# Patient Record
Sex: MALE | Race: BLACK OR AFRICAN AMERICAN | NOT HISPANIC OR LATINO | Employment: FULL TIME | URBAN - METROPOLITAN AREA
[De-identification: names, ages, dates, MRNs, and addresses within clinical notes are randomized per-mention and may not be internally consistent; named-entity substitution may affect disease eponyms.]

---

## 2017-03-22 ENCOUNTER — HOSPITAL ENCOUNTER (EMERGENCY)
Facility: HOSPITAL | Age: 47
Discharge: HOME | End: 2017-03-22
Attending: EMERGENCY MEDICINE
Payer: COMMERCIAL

## 2017-03-22 ENCOUNTER — TELEPHONE (OUTPATIENT)
Dept: INTERNAL MEDICINE | Facility: CLINIC | Age: 47
End: 2017-03-22

## 2017-03-22 VITALS
DIASTOLIC BLOOD PRESSURE: 92 MMHG | SYSTOLIC BLOOD PRESSURE: 159 MMHG | HEART RATE: 88 BPM | WEIGHT: 250 LBS | BODY MASS INDEX: 33.13 KG/M2 | OXYGEN SATURATION: 98 % | RESPIRATION RATE: 18 BRPM | HEIGHT: 73 IN | TEMPERATURE: 98 F

## 2017-03-22 DIAGNOSIS — M62.838 NECK MUSCLE SPASM: Primary | ICD-10-CM

## 2017-03-22 DIAGNOSIS — K62.5 RECTAL BLEEDING: ICD-10-CM

## 2017-03-22 PROCEDURE — 99284 EMERGENCY DEPT VISIT MOD MDM: CPT

## 2017-03-22 PROCEDURE — 96374 THER/PROPH/DIAG INJ IV PUSH: CPT

## 2017-03-22 PROCEDURE — 96375 TX/PRO/DX INJ NEW DRUG ADDON: CPT

## 2017-03-22 PROCEDURE — 99283 EMERGENCY DEPT VISIT LOW MDM: CPT

## 2017-03-22 PROCEDURE — 63600175 PHARM REV CODE 636 W HCPCS: Performed by: EMERGENCY MEDICINE

## 2017-03-22 RX ORDER — PREDNISONE 10 MG/1
10 TABLET ORAL DAILY
Qty: 21 TABLET | Refills: 0 | Status: SHIPPED | OUTPATIENT
Start: 2017-03-22 | End: 2017-04-01

## 2017-03-22 RX ORDER — NAPROXEN 375 MG/1
375 TABLET ORAL 2 TIMES DAILY WITH MEALS
Qty: 30 TABLET | Refills: 0 | Status: SHIPPED | OUTPATIENT
Start: 2017-03-22 | End: 2017-11-30

## 2017-03-22 RX ORDER — ORPHENADRINE CITRATE 30 MG/ML
60 INJECTION INTRAMUSCULAR; INTRAVENOUS
Status: COMPLETED | OUTPATIENT
Start: 2017-03-22 | End: 2017-03-22

## 2017-03-22 RX ORDER — HYDROCODONE BITARTRATE AND ACETAMINOPHEN 7.5; 325 MG/1; MG/1
1 TABLET ORAL EVERY 6 HOURS PRN
Qty: 11 TABLET | Refills: 0 | Status: SHIPPED | OUTPATIENT
Start: 2017-03-22 | End: 2017-03-23 | Stop reason: SDUPTHER

## 2017-03-22 RX ORDER — KETOROLAC TROMETHAMINE 30 MG/ML
30 INJECTION, SOLUTION INTRAMUSCULAR; INTRAVENOUS
Status: COMPLETED | OUTPATIENT
Start: 2017-03-22 | End: 2017-03-22

## 2017-03-22 RX ADMIN — KETOROLAC TROMETHAMINE 30 MG: 30 INJECTION, SOLUTION INTRAMUSCULAR at 03:03

## 2017-03-22 RX ADMIN — ORPHENADRINE CITRATE 60 MG: 30 INJECTION INTRAMUSCULAR; INTRAVENOUS at 03:03

## 2017-03-22 NOTE — TELEPHONE ENCOUNTER
----- Message from Alice Marmolejo sent at 3/22/2017 10:53 AM CDT -----  Contact: Blanca/spouse  Blanca is requesting to speak to the nurse regarding working pt in for an appt sooner than the next available with Dr. Lazo. Pt is having neck spasms. Pt has already been to the ER for it. Pls call Blanca back at [phone].

## 2017-03-22 NOTE — ED AVS SNAPSHOT
[facility] -   [address] [facility] [address]               Manny Garner III   3/22/2017  2:43 AM   ED    Description:  Male : 1970   Department:  [facility]           Your Care was Coordinated By:     Provider Role From To    Deonte Marie MD Attending Provider 17 0251 --      Reason for Visit     Rectal Bleeding           Diagnoses this Visit        Comments    Neck muscle spasm    -  Primary     Rectal bleeding           ED Disposition     None           To Do List           Follow-up Information     Follow up with Jose Lazo MD In 3 days.    Specialty:  Internal Medicine    Contact information:    [address] [facility]  [address] [place] [address] [phone]          Follow up with [facility].    Specialty:  Emergency Medicine    Why:  If symptoms worsen, Or worsening condition or any other major concern    Contact information:    [address] [facility] [address] [phone]       These Medications        Disp Refills Start End    predniSONE (DELTASONE) 10 MG tablet 21 tablet 0 3/22/2017 2017    Take 1 tablet (10 mg total) by mouth once daily. Take 4 tabs x 3 days, then  Take 2 tabs x 3 days, then   Take 1 tab x 3 days. - Oral    Pharmacy: [facility] [address] AT [place] [address] Ph #: [phone]       hydrocodone-acetaminophen 7.5-325mg (NORCO) 7.5-325 mg per tablet 11 tablet 0 3/22/2017     Take 1 tablet by mouth every 6 (six) hours as needed. - Oral    Pharmacy: [facility] [address] AT [place] [address] & [facility] 30 Ph #: [phone]       naproxen (NAPROSYN) 375 MG tablet 30 tablet 0 3/22/2017     Take 1 tablet (375 mg total) by mouth 2 (two) times daily with meals. - Oral    Pharmacy: [facility] [address] AT [place] [address] & [facility] 30 Ph #:  [phone]         [facility]     [facility] Care [facility] - 24/7 Assistance  Registered nurses in the [facility] provide clinical advisement, health education, appointment booking, and other advisory services.  Call for this free service at [phone].             Medications           Message regarding Medications     Verify the changes and/or additions to your medication regime listed below are the same as discussed with your clinician today.  If any of these changes or additions are incorrect, please notify your healthcare provider.        START taking these NEW medications        Refills    predniSONE (DELTASONE) 10 MG tablet 0    Sig: Take 1 tablet (10 mg total) by mouth once daily. Take 4 tabs x 3 days, then  Take 2 tabs x 3 days, then   Take 1 tab x 3 days.    Class: Print    Route: Oral    hydrocodone-acetaminophen 7.5-325mg (NORCO) 7.5-325 mg per tablet 0    Sig: Take 1 tablet by mouth every 6 (six) hours as needed.    Class: Print    Route: Oral    naproxen (NAPROSYN) 375 MG tablet 0    Sig: Take 1 tablet (375 mg total) by mouth 2 (two) times daily with meals.    Class: Print    Route: Oral      These medications were administered today        Dose Freq    orphenadrine injection 60 mg 60 mg ED 1 Time    Sig: Inject 2 mLs (60 mg total) into the vein ED 1 Time.    Class: Normal    Route: Intravenous    ketorolac injection 30 mg 30 mg ED 1 Time    Sig: Inject 30 mg into the vein ED 1 Time.    Class: Normal    Route: Intravenous    Non-formulary Exception Code: Defer to pharmacy      STOP taking these medications     hydrocodone-acetaminophen 5-325mg (NORCO) 5-325 mg per tablet Take 1 tablet by mouth every 6 (six) hours as needed for Pain.           Verify that the below list of medications is an accurate representation of the medications you are currently taking.  If none reported, the list may be blank. If incorrect, please contact your healthcare provider. Carry this list with  "you in case of emergency.           Current Medications     amlodipine-valsartan (EXFORGE)  mg per tablet Take 1 tablet by mouth once daily.    BYSTOLIC 10 mg Tab TAKE 1 TABLET BY MOUTH ONCE DAILY    hydrocodone-acetaminophen 7.5-325mg (NORCO) 7.5-325 mg per tablet Take 1 tablet by mouth every 6 (six) hours as needed.    levocetirizine (XYZAL) 5 MG tablet Take 1 tablet (5 mg total) by mouth every evening.    naproxen (NAPROSYN) 375 MG tablet Take 1 tablet (375 mg total) by mouth 2 (two) times daily with meals.    paroxetine (PAXIL) 20 MG tablet Take 20 mg by mouth every morning.    predniSONE (DELTASONE) 10 MG tablet Take 1 tablet (10 mg total) by mouth once daily. Take 4 tabs x 3 days, then  Take 2 tabs x 3 days, then   Take 1 tab x 3 days.           Clinical Reference Information           Your Vitals Were     BP Pulse Temp Resp Height Weight    180/91 (BP Location: Right arm, Patient Position: Lying) 95 98 °F (36.7 °C) (Oral) 20 6' 1" (1.854 m) 113.4 kg (250 lb)    SpO2 BMI             97% 32.98 kg/m2         Allergies as of 3/22/2017     No Known Allergies      Immunizations Administered on Date of Encounter - 3/22/2017     None      ED Micro, Lab, POCT     None      ED Imaging Orders     None        Discharge Instructions         Muscle Spasm  A muscle spasm is a sudden tightening of the muscle you cant control. This may be caused by strain, overworking the muscle, or injury. It can also be caused by dehydration, electrolyte imbalance, diabetes, alcohol use, and certain medicines. If it goes on long enough the muscle spasm causes pain. Common areas for muscle spasm are the legs, neck, and back.  Home care  · Heat, massage, and stretching will help relax muscle spasm.  · When the spasm is in your arm or leg, stretch the muscle passively. To do this, have someone bend or straighten the joint above or below the muscle until you feel the stretch on the sore muscle. You can stretch the muscle actively by " moving the affected body part. This will stretch the muscle that is in spasm. For example, if the spasm is in your calf, bend the ankle so your toes point upward toward your knee. This will stretch your calf muscle.  · You may use over-the-counter pain medicine to control pain, unless another medicine was prescribed. If you have chronic liver or kidney disease or ever had a stomach ulcer or GI bleeding, talk with your healthcare provider before using these medicines.  Follow-up care  Follow up with your healthcare provider, or as advised.    When to seek medical advice  Call your healthcare provider right away if any of the following occur:  · Fingers or toes become swollen, cold, blue, numb, or tingly  · You develop weakness in the affected arm or leg  · Pain increases and is not controlled by the above measures  Date Last Reviewed: 11/21/2015  © 2908-6611 [employer]. [address] [place] [address]. All rights reserved. This information is not intended as a substitute for professional medical care. Always follow your healthcare professional's instructions.          Neck Spasm    A spasm of the neck muscles can happen after a sudden awkward neck movement. Sleeping with your neck in a crooked position can also cause spasm. Some people respond to emotional stress by tensing the muscles of their neck, shoulders, and upper back. If neck spasm lasts long enough, it can cause headache.  The treatment described below will usually help the pain to go away in 5 to 7 days. Pain that continues may need further evaluation or other types of treatment such as physical therapy.  Home care  · Rest and relax the muscles. Use a comfortable pillow that supports the head and keeps the spine in a neutral position. The position of the head should not be tilted forward or backward. A rolled up towel may help for a custom fit.  · Some people find relief with heat. Heat can be applied with either a warm shower or  bath or a moist towel heated in the microwave and massage. Others prefer cold packs. You can make an ice pack by filling a plastic bag that seals at the top with ice cubes or crushed ice and then wrapping it with a thin towel. Try both and use the method that feels best for 15 to 20 minutes, several times a day.  · Whether using ice or heat, be careful that you do not injure your skin. Never put ice directly on the skin. Always wrap the ice in a towel or other type of cloth. This is very important, especially in people with poor skin sensations.  · Try to reduce your stress level. Emotional stress can lead to neck muscle tension and get in the way of or delay the healing process.  · You may use over-the-counter pain medicine to control pain, unless another medicine was prescribed.If you have chronic liver or kidney disease or ever had a stomach ulcer or GI bleeding, talk with your healthcare provider before using these medicines.  Follow-up care  Follow up with your healthcare provider if your symptoms do not show signs of improvement after one week. Physical therapy or further tests may be needed.  If X-rays, CT scans, or MRI scans were taken, you will be told of any new findings that may affect your care.  Call 911  Call 911 if you have:  · Sudden weakness or numbness in one or both arms  · Neck swelling, difficulty or painful swallowing  · Difficulty breathing  · Chest pain  When to seek medical advice  Call your healthcare provider right away if any of these occur:  · Pain becomes worse or spreads into one or both arms  · Increasing headache with nausea or vomiting  · Fever of 100.4°F (38°C) or above lasting for 24 to 48 hours  Date Last Reviewed: 11/21/2015  © 1412-1423 [employer]. [address] [place] [address], [place] [address]. All rights reserved. This information is not intended as a substitute for professional medical care. Always follow your healthcare professional's  instructions.          Evaluating and Treating Rectal Bleeding     As part of your evaluation, a flexible sigmoidoscopy or colonoscopy may be done. You may also have an upper endoscopy if your stools are darker.     To find the site and cause of your bleeding, you will have a physical exam. You will be asked about your health history. Tests may be done to help confirm the diagnosis and plan your treatment.  Tests you may have  Any of these procedures may be done:  · Stool sample. A small amount of your stool will be checked for blood.  · Anoscopy. This test uses a small tube (anoscope) to examine the anus. It checks for problems such as hemorrhoids.  · Sigmoidoscopy. This test uses a lighted tube to check your rectum and the part of the large intestine that is closest to the rectum (the sigmoid colon).  · Colonoscopy. This test looks at your rectum and entire colon. You may be given medicine through an IV to help you relax.  · Lower GI (gastrointestinal) series, or barium enema. This is an X-ray test to view your colon. A milky liquid containing barium is passed through your rectum and into the colon. This liquid makes it easy to see your colon on the X-ray.  · Upper endoscopy. This test checks your esophagus, stomach, and upper small intestine. It is done in cases of rectal bleeding along with other symptoms like low blood pressure and rapid heartbeat. This test may also be done if your stools are dark black and tarry.  · Capsule endoscopy. For this test, you swallow a pill that has a tiny camera inside. The camera takes pictures of your small intestine. It can get to areas that are hard to reach with colonoscopy and upper endoscopy.  · Balloon enteroscopy. This test uses a special tube (scope) to get deep into the small intestine.  · Tagged red blood cell scan. This test marks (tags) red blood cells with very small amounts of radioactive material. The cells can then be seen and tracked on a scan.  · Angiography.  This test threads a tube (catheter) through a vein, often in the leg. The tube injects dye into your blood vessels to see where the bleeding is taking place.  Your treatment plan  Your treatment will depend on the cause of your rectal bleeding. Your healthcare provider will create a treatment plan thats right for you. Sometimes rectal bleeding stops on its own. If it does, be sure to see your provider to check that the problem wasnt serious.  What you can do  Follow all your doctors instructions. Keep working with your doctor after your treatment. Make and keep your follow-up visits. If you have more rectal bleeding, call your doctor. It may be a sign of the same or another health problem.   Date Last Reviewed: 7/1/2016 © 2000-2016 [employer]. [address] [place] [address]. All rights reserved. This information is not intended as a substitute for professional medical care. Always follow your healthcare professional's instructions.          Understanding Rectal Bleeding    Rectal bleeding is when blood passes through your rectum and anus. It can happen with or without a bowel movement. Rectal bleeding may be a sign of a serious problem in your rectum, colon, or upper GI tract. Call your healthcare provider right away if you have any rectal bleeding.  The GI Tract  The gastrointestinal (GI) tract includes the mouth, esophagus, stomach, small intestine, large intestine (colon), rectum, and anus. The food you eat is digested as it passes through the GI tract. Solid waste leaves the body through the rectum.   Rectal bleeding and GI problems  The cause of rectal bleeding may be found in any region of the GI tract. The colon or rectum may be the site of your bleeding problem. Or, bleeding may be due to problems farther up the GI tract, such as in the small intestine, duodenum, or stomach.  Causes of rectal bleeding  Rectal bleeding causes include the following:  · Hemorrhoids (swollen veins  in the rectum and anus)  · Fissures (tears in or near the anus)  · Diverticulosis (inflamed pockets in the colon wall)  · Infection  · Ischemia (low blood flow)  · Radiation damage  · Inflammatory bowel disease (Crohn's disease or ulcerative colitis)  · Ulcers in the upper GI tract and inflammation of the large intestine  · Abnormal tissue growths (tumors or polyps) in the GI tract  · A bulging rectum (also called a rectal prolapse)  · Abnormal blood vessels in the small intestine or in the colon  Common symptoms  Common symptoms include the following:  · Rectal pain, itching, or soreness  · Belly pain or epigastric pain  · Minor occasional drops of blood that appear on the stool or toilet paper, to greater amounts of stool that appear black or tarry   Rectal bleeding can also happen without pain.  Date Last Reviewed: 7/1/2016  © 4783-6756 [employer]. [address] [place] [address]. All rights reserved. This information is not intended as a substitute for professional medical care. Always follow your healthcare professional's instructions.          [facility] Sign-Up     Activating your [facility] account is as easy as 1-2-3!     1) Visit [email or web address], select Sign Up Now, enter this activation code and your date of birth, then select Next.  [email or web address]  Expires: 5/6/2017  4:14 AM      2) Create a username and password to use when you visit [facility] in the future and select a security question in case you lose your password and select Next.    3) Enter your e-mail address and click Sign Up!    Additional Information  If you have questions, please e-mail [email or web address] or call [phone] to talk to our [facility] staff. Remember, [facility] is NOT to be used for urgent needs. For medical emergencies, dial 911.         Smoking Cessation     If you would like to quit smoking:   You may be eligible for free services if you are a [place] resident and started smoking cigarettes  before September 1, 1988.  Call the [employer]oking [employer]essation [employer] (SCT) toll free at (009) 033-6369 or (368) 070-1046.   Call 1-800-QUIT-NOW if you do not meet the above criteria.             [facility] complies with applicable Federal civil rights laws and does not discriminate on the basis of race, color, national origin, age, disability, or sex.        Language Assistance Services     ATTENTION: Language assistance services are available, free of charge. Please call [phone].      ATENCIÓN: Si habla español, tiene a [place] disposición servicios gratuitos de asistencia lingüística. Llame al [phone].     CHÚ Ý: N?u b?n nói Ti?ng Vi?t, có các d?ch v? h? tr? ngôn ng? mi?n phí dành cho b?n. G?i s? [phone].

## 2017-03-22 NOTE — ED PROVIDER NOTES
"SCRIBE #1 NOTE: I, Herbert Mccormackcoran, am scribing for, and in the presence of, Deonte Marie MD. I have scribed the entire note.      History      Chief Complaint   Patient presents with    Rectal Bleeding     Reports bleeding for 3 days. C/O neck spasms       Review of patient's allergies indicates:  No Known Allergies     HPI   HPI    3/22/2017, 2:45 AM   History obtained from the patient      History of Present Illness: Manny Garner III is a 46 y.o. male patient who presents to the Emergency Department for R sided neck pain which onset suddenly last PM. Pt states he thinks he slept on his neck wrong and reports severe muscle spasms to his R neck and R trapezius. Symptoms are constant and moderate in severity. Sx are exacerbated by nothing and relieved by nothing. Pt reports taking aleve last PM with no relief. Associated sxs include rectal bleeding which was noticed 3 days ago. Pt describes the bleeding as scant red blood when he wipes and states "he does not have blood every time he has a bowel movement." Patient denies any fever, N/V/D, chills, abd pain, constipation, dysuria, difficulty urinating, SOB, CP, recent injury, back pain  and all other sxs at this time. No further complaints or concerns at this time.     Arrival mode: Personal vehicle     PCP: Jose Lazo MD       Past Medical History:  Past Medical History:   Diagnosis Date    Allergy     Anxiety     HTN (hypertension) 2011    Hyperlipidemia        Past Surgical History:  Past Surgical History:   Procedure Laterality Date    CERVICAL DISC SURGERY  2012    fustion and corpectomy c3c4    VERTEBRAL CORPECTOMY  2012         Family History:  Family History   Problem Relation Age of Onset    Hypertension Mother     Diabetes Mother     Hypertension Father     Hypertension Maternal Grandmother     Hypertension Maternal Grandfather     Hypertension Paternal Grandmother     Hypertension Paternal Grandfather        Social History:  Social " History     Social History Main Topics    Smoking status: Former Smoker     Packs/day: 1.00     Years: 14.00     Quit date: 2000    Smokeless tobacco: Never Used    Alcohol use 1.2 oz/week     2 Shots of liquor per week    Drug use: No    Sexual activity: Yes     Partners: Female       ROS   Review of Systems   Constitutional: Negative for chills and fever.   HENT: Negative for congestion and sore throat.    Respiratory: Negative for chest tightness and shortness of breath.    Cardiovascular: Negative for chest pain.   Gastrointestinal: Positive for anal bleeding. Negative for abdominal pain, nausea and vomiting.   Musculoskeletal: Positive for myalgias (R trapezius) and neck pain. Negative for back pain.   Skin: Negative for rash.   Neurological: Negative for dizziness, numbness and headaches.   Psychiatric/Behavioral: Negative for agitation and confusion.   All other systems reviewed and are negative.      Physical Exam    Initial Vitals   BP Pulse Resp Temp SpO2   03/22/17 0240 03/22/17 0240 03/22/17 0240 03/22/17 0240 03/22/17 0240   180/91 95 20 98 °F (36.7 °C) 97 %      Physical Exam  Nursing Notes and Vital Signs Reviewed.  Constitutional: Patient is in no apparent distress. Awake and alert. Well-developed and well-nourished.  Head: Atraumatic. Normocephalic.  Eyes: PERRL. EOM intact. Conjunctivae are not pale. No scleral icterus.  ENT: Mucous membranes are moist. Oropharynx is clear and symmetric.    Neck: Supple. Full ROM. No lymphadenopathy. Significant TTP noted to the R para cervical muscles, R trapezius, and to his R shoulder muscles. Increased pain noted with turning his head to the R. No cervical midline bony tenderness, deformities, or step-offs.   Cardiovascular: Regular rate. Regular rhythm. No murmurs, rubs, or gallops. Distal pulses are 2+ and symmetric.  Pulmonary/Chest: No respiratory distress. Clear to auscultation bilaterally. No wheezing, rales, or rhonchi.  Abdominal: Soft and  "non-distended.  There is no tenderness.  No rebound, guarding, or rigidity. Good bowel sounds.  Musculoskeletal: Moves all extremities. No obvious deformities. No edema. No calf tenderness.  Skin: Warm and dry.  Neurological:  Alert, awake, and appropriate.  Normal speech.  No acute focal neurological deficits are appreciated.  Psychiatric: Normal affect. Good eye contact. Appropriate in content.    ED Course    Procedures  ED Vital Signs:  Vitals:    03/22/17 0240 03/22/17 0425   BP: (!) 180/91 (!) 159/92   Pulse: 95 88   Resp: 20 18   Temp: 98 °F (36.7 °C)    TempSrc: Oral    SpO2: 97% 98%   Weight: 113.4 kg (250 lb)    Height: 6' 1" (1.854 m)        Abnormal Lab Results:  Labs Reviewed - No data to display            The Emergency Provider reviewed the vital signs and test results, which are outlined above.    ED Discussion     4:16 AM: Reassessed pt at this time.  Pt states his condition has improved at this time and he is feeling better. Pt is laying comfortably in ED bed and in NAD. Pt is awake, alert, and oriented. Discussed with pt all pertinent ED information and results. Discussed pt dx and plan of tx. Gave pt all f/u and return to the ED instructions. All questions and concerns were addressed at this time. Pt expresses understanding of information and instructions, and is comfortable with plan to discharge. Pt is stable for discharge.    Pre-hypertension/Hypertension: The pt has been informed that they may have pre-hypertension or hypertension based on a blood pressure reading in the ED. I recommend that the pt call the PCP listed on their discharge instructions or a physician of their choice this week to arrange f/u for further evaluation of possible pre-hypertension or hypertension.     ED Medication(s):  Medications   orphenadrine injection 60 mg (60 mg Intravenous Given 3/22/17 0331)   ketorolac injection 30 mg (30 mg Intravenous Given 3/22/17 0330)       Discharge Medication List as of 3/22/2017  " 4:14 AM      START taking these medications    Details   naproxen (NAPROSYN) 375 MG tablet Take 1 tablet (375 mg total) by mouth 2 (two) times daily with meals., Starting 3/22/2017, Until Discontinued, Print      predniSONE (DELTASONE) 10 MG tablet Take 1 tablet (10 mg total) by mouth once daily. Take 4 tabs x 3 days, then  Take 2 tabs x 3 days, then   Take 1 tab x 3 days., Starting 3/22/2017, Until Sat 4/1/17, Print      hydrocodone-acetaminophen 7.5-325mg (NORCO) 7.5-325 mg per tablet Take 1 tablet by mouth every 6 (six) hours as needed., Starting 3/22/2017, Until Discontinued, Print             Follow-up Information     Follow up with Jose Lazo MD In 3 days.    Specialty:  Internal Medicine    Contact information:    [address] [facility]  [address] [place] [address] [phone]          Follow up with [facility] - .    Specialty:  Emergency Medicine    Why:  If symptoms worsen, Or worsening condition or any other major concern    Contact information:    [address] [facility] [address] [phone]            Medical Decision Making              Scribe Attestation:   Scribe #1: I performed the above scribed service and the documentation accurately describes the services I performed. I attest to the accuracy of the note.    Attending:   Physician Attestation Statement for Scribe #1: I, Deonte Marie MD, personally performed the services described in this documentation, as scribed by Herbert Sandoval, in my presence, and it is both accurate and complete.          Clinical Impression       ICD-10-CM ICD-9-CM   1. Neck muscle spasm M62.838 728.85   2. Rectal bleeding K62.5 569.3       Disposition:   Disposition: Discharged  Condition: Stable         Deonte Marie MD  03/24/17 1205

## 2017-03-22 NOTE — ED NOTES
Pt resting in bed.  He states his pain and ROM have improved. No acute distress, RR equal and non labored, VSS. Bed in low, locked, position and call light is within reach. Side rails up X 2. Spouse remains at bedside. Will continue to monitor.

## 2017-03-22 NOTE — DISCHARGE INSTRUCTIONS
Muscle Spasm  A muscle spasm is a sudden tightening of the muscle you cant control. This may be caused by strain, overworking the muscle, or injury. It can also be caused by dehydration, electrolyte imbalance, diabetes, alcohol use, and certain medicines. If it goes on long enough the muscle spasm causes pain. Common areas for muscle spasm are the legs, neck, and back.  Home care  · Heat, massage, and stretching will help relax muscle spasm.  · When the spasm is in your arm or leg, stretch the muscle passively. To do this, have someone bend or straighten the joint above or below the muscle until you feel the stretch on the sore muscle. You can stretch the muscle actively by moving the affected body part. This will stretch the muscle that is in spasm. For example, if the spasm is in your calf, bend the ankle so your toes point upward toward your knee. This will stretch your calf muscle.  · You may use over-the-counter pain medicine to control pain, unless another medicine was prescribed. If you have chronic liver or kidney disease or ever had a stomach ulcer or GI bleeding, talk with your healthcare provider before using these medicines.  Follow-up care  Follow up with your healthcare provider, or as advised.    When to seek medical advice  Call your healthcare provider right away if any of the following occur:  · Fingers or toes become swollen, cold, blue, numb, or tingly  · You develop weakness in the affected arm or leg  · Pain increases and is not controlled by the above measures  Date Last Reviewed: 11/21/2015  © 2128-4965 [employer]. [address] [place] [address], [place] [address]. All rights reserved. This information is not intended as a substitute for professional medical care. Always follow your healthcare professional's instructions.          Neck Spasm    A spasm of the neck muscles can happen after a sudden awkward neck movement. Sleeping with your neck in a crooked position can also cause  spasm. Some people respond to emotional stress by tensing the muscles of their neck, shoulders, and upper back. If neck spasm lasts long enough, it can cause headache.  The treatment described below will usually help the pain to go away in 5 to 7 days. Pain that continues may need further evaluation or other types of treatment such as physical therapy.  Home care  · Rest and relax the muscles. Use a comfortable pillow that supports the head and keeps the spine in a neutral position. The position of the head should not be tilted forward or backward. A rolled up towel may help for a custom fit.  · Some people find relief with heat. Heat can be applied with either a warm shower or bath or a moist towel heated in the microwave and massage. Others prefer cold packs. You can make an ice pack by filling a plastic bag that seals at the top with ice cubes or crushed ice and then wrapping it with a thin towel. Try both and use the method that feels best for 15 to 20 minutes, several times a day.  · Whether using ice or heat, be careful that you do not injure your skin. Never put ice directly on the skin. Always wrap the ice in a towel or other type of cloth. This is very important, especially in people with poor skin sensations.  · Try to reduce your stress level. Emotional stress can lead to neck muscle tension and get in the way of or delay the healing process.  · You may use over-the-counter pain medicine to control pain, unless another medicine was prescribed.If you have chronic liver or kidney disease or ever had a stomach ulcer or GI bleeding, talk with your healthcare provider before using these medicines.  Follow-up care  Follow up with your healthcare provider if your symptoms do not show signs of improvement after one week. Physical therapy or further tests may be needed.  If X-rays, CT scans, or MRI scans were taken, you will be told of any new findings that may affect your care.  Call 911  Call 911 if you  have:  · Sudden weakness or numbness in one or both arms  · Neck swelling, difficulty or painful swallowing  · Difficulty breathing  · Chest pain  When to seek medical advice  Call your healthcare provider right away if any of these occur:  · Pain becomes worse or spreads into one or both arms  · Increasing headache with nausea or vomiting  · Fever of 100.4°F (38°C) or above lasting for 24 to 48 hours  Date Last Reviewed: 11/21/2015 © 2000-2016 [employer]. [address] [place] [address], [place] [address]. All rights reserved. This information is not intended as a substitute for professional medical care. Always follow your healthcare professional's instructions.          Evaluating and Treating Rectal Bleeding     As part of your evaluation, a flexible sigmoidoscopy or colonoscopy may be done. You may also have an upper endoscopy if your stools are darker.     To find the site and cause of your bleeding, you will have a physical exam. You will be asked about your health history. Tests may be done to help confirm the diagnosis and plan your treatment.  Tests you may have  Any of these procedures may be done:  · Stool sample. A small amount of your stool will be checked for blood.  · Anoscopy. This test uses a small tube (anoscope) to examine the anus. It checks for problems such as hemorrhoids.  · Sigmoidoscopy. This test uses a lighted tube to check your rectum and the part of the large intestine that is closest to the rectum (the sigmoid colon).  · Colonoscopy. This test looks at your rectum and entire colon. You may be given medicine through an IV to help you relax.  · Lower GI (gastrointestinal) series, or barium enema. This is an X-ray test to view your colon. A milky liquid containing barium is passed through your rectum and into the colon. This liquid makes it easy to see your colon on the X-ray.  · Upper endoscopy. This test checks your esophagus, stomach, and upper small intestine. It is done in  cases of rectal bleeding along with other symptoms like low blood pressure and rapid heartbeat. This test may also be done if your stools are dark black and tarry.  · Capsule endoscopy. For this test, you swallow a pill that has a tiny camera inside. The camera takes pictures of your small intestine. It can get to areas that are hard to reach with colonoscopy and upper endoscopy.  · Balloon enteroscopy. This test uses a special tube (scope) to get deep into the small intestine.  · Tagged red blood cell scan. This test marks (tags) red blood cells with very small amounts of radioactive material. The cells can then be seen and tracked on a scan.  · Angiography. This test threads a tube (catheter) through a vein, often in the leg. The tube injects dye into your blood vessels to see where the bleeding is taking place.  Your treatment plan  Your treatment will depend on the cause of your rectal bleeding. Your healthcare provider will create a treatment plan thats right for you. Sometimes rectal bleeding stops on its own. If it does, be sure to see your provider to check that the problem wasnt serious.  What you can do  Follow all your doctors instructions. Keep working with your doctor after your treatment. Make and keep your follow-up visits. If you have more rectal bleeding, call your doctor. It may be a sign of the same or another health problem.   Date Last Reviewed: 7/1/2016 © 2000-2016 [employer]. [address] [place] [address]. All rights reserved. This information is not intended as a substitute for professional medical care. Always follow your healthcare professional's instructions.          Understanding Rectal Bleeding    Rectal bleeding is when blood passes through your rectum and anus. It can happen with or without a bowel movement. Rectal bleeding may be a sign of a serious problem in your rectum, colon, or upper GI tract. Call your healthcare provider right away if you have  any rectal bleeding.  The GI Tract  The gastrointestinal (GI) tract includes the mouth, esophagus, stomach, small intestine, large intestine (colon), rectum, and anus. The food you eat is digested as it passes through the GI tract. Solid waste leaves the body through the rectum.   Rectal bleeding and GI problems  The cause of rectal bleeding may be found in any region of the GI tract. The colon or rectum may be the site of your bleeding problem. Or, bleeding may be due to problems farther up the GI tract, such as in the small intestine, duodenum, or stomach.  Causes of rectal bleeding  Rectal bleeding causes include the following:  · Hemorrhoids (swollen veins in the rectum and anus)  · Fissures (tears in or near the anus)  · Diverticulosis (inflamed pockets in the colon wall)  · Infection  · Ischemia (low blood flow)  · Radiation damage  · Inflammatory bowel disease (Crohn's disease or ulcerative colitis)  · Ulcers in the upper GI tract and inflammation of the large intestine  · Abnormal tissue growths (tumors or polyps) in the GI tract  · A bulging rectum (also called a rectal prolapse)  · Abnormal blood vessels in the small intestine or in the colon  Common symptoms  Common symptoms include the following:  · Rectal pain, itching, or soreness  · Belly pain or epigastric pain  · Minor occasional drops of blood that appear on the stool or toilet paper, to greater amounts of stool that appear black or tarry   Rectal bleeding can also happen without pain.  Date Last Reviewed: 7/1/2016  © 7789-9577 [employer]. [address] [place] [address], [place] [address]. All rights reserved. This information is not intended as a substitute for professional medical care. Always follow your healthcare professional's instructions.

## 2017-03-23 ENCOUNTER — OFFICE VISIT (OUTPATIENT)
Dept: INTERNAL MEDICINE | Facility: CLINIC | Age: 47
End: 2017-03-23
Payer: COMMERCIAL

## 2017-03-23 VITALS
WEIGHT: 245.56 LBS | BODY MASS INDEX: 32.54 KG/M2 | HEART RATE: 78 BPM | TEMPERATURE: 99 F | DIASTOLIC BLOOD PRESSURE: 80 MMHG | SYSTOLIC BLOOD PRESSURE: 128 MMHG | HEIGHT: 73 IN

## 2017-03-23 DIAGNOSIS — Z98.1 S/P CERVICAL SPINAL FUSION: ICD-10-CM

## 2017-03-23 DIAGNOSIS — M62.838 MUSCLE SPASMS OF NECK: Primary | ICD-10-CM

## 2017-03-23 PROCEDURE — 1160F RVW MEDS BY RX/DR IN RCRD: CPT | Mod: S$GLB,,, | Performed by: NURSE PRACTITIONER

## 2017-03-23 PROCEDURE — 99213 OFFICE O/P EST LOW 20 MIN: CPT | Mod: S$GLB,,, | Performed by: NURSE PRACTITIONER

## 2017-03-23 PROCEDURE — 3074F SYST BP LT 130 MM HG: CPT | Mod: S$GLB,,, | Performed by: NURSE PRACTITIONER

## 2017-03-23 PROCEDURE — 3079F DIAST BP 80-89 MM HG: CPT | Mod: S$GLB,,, | Performed by: NURSE PRACTITIONER

## 2017-03-23 PROCEDURE — 99999 PR PBB SHADOW E&M-EST. PATIENT-LVL III: CPT | Mod: PBBFAC,,, | Performed by: NURSE PRACTITIONER

## 2017-03-23 RX ORDER — CYCLOBENZAPRINE HCL 10 MG
10 TABLET ORAL 3 TIMES DAILY PRN
Qty: 45 TABLET | Refills: 0 | Status: SHIPPED | OUTPATIENT
Start: 2017-03-23 | End: 2017-04-02

## 2017-03-23 RX ORDER — HYDROCODONE BITARTRATE AND ACETAMINOPHEN 7.5; 325 MG/1; MG/1
1 TABLET ORAL EVERY 8 HOURS PRN
Qty: 30 TABLET | Refills: 0 | Status: SHIPPED | OUTPATIENT
Start: 2017-03-23 | End: 2017-04-24

## 2017-03-23 NOTE — MR AVS SNAPSHOT
[facility]  [address] [place] [address]  Phone: [phone]  Fax: [phone]                  Manny Garner III   3/23/2017 7:20 AM   Office Visit    Description:  Male : 1970   Provider:  JONATHAN Borden,MARK-C   Department:  [facility]           Reason for Visit     Neck Pain           Diagnoses this Visit        Comments    Muscle spasms of neck    -  Primary     S/P cervical spinal fusion                To Do List           Goals (5 Years of Data)     None       These Medications        Disp Refills Start End    hydrocodone-acetaminophen 7.5-325mg (NORCO) 7.5-325 mg per tablet 30 tablet 0 3/23/2017     Take 1 tablet by mouth every 8 (eight) hours as needed. - Oral    Pharmacy: [facility] [address] [place] [address] [facility] [place] [facility] 30 Ph #: [phone]       cyclobenzaprine (FLEXERIL) 10 MG tablet 45 tablet 0 3/23/2017 2017    Take 1 tablet (10 mg total) by mouth 3 (three) times daily as needed for Muscle spasms. - Oral    Pharmacy: [email or web address] [address] [place] [address] 30 AT [facility] & [facility] 30 Ph #: [phone]         [facility] On Call     [facility] On Call Nurse Care Line -  Assistance  Registered nurses in the [facility] provide clinical advisement, health education, appointment booking, and other advisory services.  Call for this free service at [phone].             Medications           Message regarding Medications     Verify the changes and/or additions to your medication regime listed below are the same as discussed with your clinician today.  If any of these changes or additions are incorrect, please notify your healthcare provider.        START taking these NEW medications        Refills    cyclobenzaprine (FLEXERIL) 10 MG tablet 0    Sig: Take 1 tablet (10 mg total) by mouth 3 (three) times daily as needed for Muscle spasms.    Class:  "Normal    Route: Oral      CHANGE how you are taking these medications     Start Taking Instead of    hydrocodone-acetaminophen 7.5-325mg (NORCO) 7.5-325 mg per tablet hydrocodone-acetaminophen 7.5-325mg (NORCO) 7.5-325 mg per tablet    Dosage:  Take 1 tablet by mouth every 8 (eight) hours as needed. Dosage:  Take 1 tablet by mouth every 6 (six) hours as needed.    Reason for Change:  Reorder            Verify that the below list of medications is an accurate representation of the medications you are currently taking.  If none reported, the list may be blank. If incorrect, please contact your healthcare provider. Carry this list with you in case of emergency.           Current Medications     amlodipine-valsartan (EXFORGE)  mg per tablet Take 1 tablet by mouth once daily.    BYSTOLIC 10 mg Tab TAKE 1 TABLET BY MOUTH ONCE DAILY    cyclobenzaprine (FLEXERIL) 10 MG tablet Take 1 tablet (10 mg total) by mouth 3 (three) times daily as needed for Muscle spasms.    hydrocodone-acetaminophen 7.5-325mg (NORCO) 7.5-325 mg per tablet Take 1 tablet by mouth every 8 (eight) hours as needed.    levocetirizine (XYZAL) 5 MG tablet Take 1 tablet (5 mg total) by mouth every evening.    naproxen (NAPROSYN) 375 MG tablet Take 1 tablet (375 mg total) by mouth 2 (two) times daily with meals.    paroxetine (PAXIL) 20 MG tablet Take 20 mg by mouth every morning.    predniSONE (DELTASONE) 10 MG tablet Take 1 tablet (10 mg total) by mouth once daily. Take 4 tabs x 3 days, then  Take 2 tabs x 3 days, then   Take 1 tab x 3 days.           Clinical Reference Information           Your Vitals Were     BP Pulse Temp Height Weight BMI    128/80 78 98.5 °F (36.9 °C) (Tympanic) 6' 1" (1.854 m) 111.4 kg (245 lb 9.5 oz) 32.4 kg/m2      Blood Pressure          Most Recent Value    BP  128/80      Allergies as of 3/23/2017     No Known Allergies      Immunizations Administered on Date of Encounter - 3/23/2017     None      [facility] Sign-Up     " Activating your [facility] account is as easy as 1-2-3!     1) Visit [email or web address], select Sign Up Now, enter this activation code and your date of birth, then select Next.  [email or web address]  Expires: 5/6/2017  4:14 AM      2) Create a username and password to use when you visit [facility] in the future and select a security question in case you lose your password and select Next.    3) Enter your e-mail address and click Sign Up!    Additional Information  If you have questions, please e-mail [email or web address] or call [phone] to talk to our [facility] staff. Remember, [facility] is NOT to be used for urgent needs. For medical emergencies, dial 911.         Instructions      Neck Spasm    A spasm of the neck muscles can happen after a sudden awkward neck movement. Sleeping with your neck in a crooked position can also cause spasm. Some people respond to emotional stress by tensing the muscles of their neck, shoulders, and upper back. If neck spasm lasts long enough, it can cause headache.  The treatment described below will usually help the pain to go away in 5 to 7 days. Pain that continues may need further evaluation or other types of treatment such as physical therapy.  Home care  · Rest and relax the muscles. Use a comfortable pillow that supports the head and keeps the spine in a neutral position. The position of the head should not be tilted forward or backward. A rolled up towel may help for a custom fit.  · Some people find relief with heat. Heat can be applied with either a warm shower or bath or a moist towel heated in the microwave and massage. Others prefer cold packs. You can make an ice pack by filling a plastic bag that seals at the top with ice cubes or crushed ice and then wrapping it with a thin towel. Try both and use the method that feels best for 15 to 20 minutes, several times a day.  · Whether using ice or heat, be careful that you do not injure your skin. Never put ice directly on the  skin. Always wrap the ice in a towel or other type of cloth. This is very important, especially in people with poor skin sensations.  · Try to reduce your stress level. Emotional stress can lead to neck muscle tension and get in the way of or delay the healing process.  · You may use over-the-counter pain medicine to control pain, unless another medicine was prescribed.If you have chronic liver or kidney disease or ever had a stomach ulcer or GI bleeding, talk with your healthcare provider before using these medicines.  Follow-up care  Follow up with your healthcare provider if your symptoms do not show signs of improvement after one week. Physical therapy or further tests may be needed.  If X-rays, CT scans, or MRI scans were taken, you will be told of any new findings that may affect your care.  Call 911  Call 911 if you have:  · Sudden weakness or numbness in one or both arms  · Neck swelling, difficulty or painful swallowing  · Difficulty breathing  · Chest pain  When to seek medical advice  Call your healthcare provider right away if any of these occur:  · Pain becomes worse or spreads into one or both arms  · Increasing headache with nausea or vomiting  · Fever of 100.4°F (38°C) or above lasting for 24 to 48 hours  Date Last Reviewed: 11/21/2015  © 0579-9245 [employer]. [address] [place] [address], [place] [address]. All rights reserved. This information is not intended as a substitute for professional medical care. Always follow your healthcare professional's instructions.             Language Assistance Services     ATTENTION: Language assistance services are available, free of charge. Please call [phone].      ATENCIÓN: Si habla español, tiene a [place] disposición servicios gratuitos de asistencia lingüística. Llame al [phone].     [facility] Ý: N?u b?n nói Ti?ng Vi?t, có các d?ch v? h? tr? ngôn ng? mi?n phí dành cho b?n. G?i s? [phone].         [facility] complies  with applicable Federal civil rights laws and does not discriminate on the basis of race, color, national origin, age, disability, or sex.

## 2017-03-23 NOTE — PATIENT INSTRUCTIONS
Neck Spasm    A spasm of the neck muscles can happen after a sudden awkward neck movement. Sleeping with your neck in a crooked position can also cause spasm. Some people respond to emotional stress by tensing the muscles of their neck, shoulders, and upper back. If neck spasm lasts long enough, it can cause headache.  The treatment described below will usually help the pain to go away in 5 to 7 days. Pain that continues may need further evaluation or other types of treatment such as physical therapy.  Home care  · Rest and relax the muscles. Use a comfortable pillow that supports the head and keeps the spine in a neutral position. The position of the head should not be tilted forward or backward. A rolled up towel may help for a custom fit.  · Some people find relief with heat. Heat can be applied with either a warm shower or bath or a moist towel heated in the microwave and massage. Others prefer cold packs. You can make an ice pack by filling a plastic bag that seals at the top with ice cubes or crushed ice and then wrapping it with a thin towel. Try both and use the method that feels best for 15 to 20 minutes, several times a day.  · Whether using ice or heat, be careful that you do not injure your skin. Never put ice directly on the skin. Always wrap the ice in a towel or other type of cloth. This is very important, especially in people with poor skin sensations.  · Try to reduce your stress level. Emotional stress can lead to neck muscle tension and get in the way of or delay the healing process.  · You may use over-the-counter pain medicine to control pain, unless another medicine was prescribed.If you have chronic liver or kidney disease or ever had a stomach ulcer or GI bleeding, talk with your healthcare provider before using these medicines.  Follow-up care  Follow up with your healthcare provider if your symptoms do not show signs of improvement after one week. Physical therapy or further tests may be  needed.  If X-rays, CT scans, or MRI scans were taken, you will be told of any new findings that may affect your care.  Call 911  Call 911 if you have:  · Sudden weakness or numbness in one or both arms  · Neck swelling, difficulty or painful swallowing  · Difficulty breathing  · Chest pain  When to seek medical advice  Call your healthcare provider right away if any of these occur:  · Pain becomes worse or spreads into one or both arms  · Increasing headache with nausea or vomiting  · Fever of 100.4°F (38°C) or above lasting for 24 to 48 hours  Date Last Reviewed: 11/21/2015  © 3110-6347 [employer]. [address] [place] [address], [place] [address]. All rights reserved. This information is not intended as a substitute for professional medical care. Always follow your healthcare professional's instructions.

## 2017-03-23 NOTE — PROGRESS NOTES
Subjective:       Patient ID: Manny Garner III is a 46 y.o. male.    Chief Complaint: Neck Pain    Neck Pain    This is a new problem. Episode onset: 3 days ago. The problem occurs constantly. The problem has been unchanged. The pain is associated with a sleep position (history of cervical fusion C3-C6 5 years ago). The pain is present in the right side. The quality of the pain is described as aching, cramping and shooting. The pain is severe. The symptoms are aggravated by position and twisting. The pain is worse during the night. Stiffness is present all day. Associated symptoms include headaches. Pertinent negatives include no chest pain, numbness, pain with swallowing, paresis, photophobia, syncope, tingling, trouble swallowing, visual change, weakness or weight loss. He has tried oral narcotics (was given shot of muscle relaxer at ER with significant help) for the symptoms. The treatment provided mild relief.     Review of Systems   Constitutional: Negative for weight loss.   HENT: Negative for trouble swallowing.    Eyes: Negative for photophobia.   Cardiovascular: Negative for chest pain and syncope.   Musculoskeletal: Positive for neck pain.   Neurological: Positive for headaches. Negative for tingling, weakness and numbness.       Objective:      Physical Exam   Constitutional: He is oriented to person, place, and time. He appears well-developed. No distress.   Eyes: Conjunctivae are normal. Pupils are equal, round, and reactive to light.   Neck: Trachea normal. Muscular tenderness present. No spinous process tenderness present. Decreased range of motion (limited by pain) present. No thyromegaly present.       Cardiovascular: Normal rate, regular rhythm, normal heart sounds and intact distal pulses.    Pulmonary/Chest: Effort normal and breath sounds normal. No respiratory distress.   Musculoskeletal:        Right shoulder: He exhibits tenderness, pain and spasm. He exhibits normal range of motion and no  bony tenderness.        Arms:  Neurological: He is alert and oriented to person, place, and time.   Skin: Skin is warm and dry. No rash noted. He is not diaphoretic.   Psychiatric: He has a normal mood and affect. His behavior is normal. Judgment and thought content normal.       Assessment:       1. Muscle spasms of neck    2. S/P cervical spinal fusion        Plan:       *Muscle spasms of neck  Discussed supportive home care such as massage, ice/heat, gentle stretching. Also discussed proper ergonomics. Not sleeping with too many pillows. Discussed need for strengthening after recovery for prevention. Follow up if pain not resolved over next 1-2 weeks will consider PT or referral at that time.  -     hydrocodone-acetaminophen 7.5-325mg (NORCO) 7.5-325 mg per tablet; Take 1 tablet by mouth every 8 (eight) hours as needed.  Dispense: 30 tablet; Refill: 0  -     cyclobenzaprine (FLEXERIL) 10 MG tablet; Take 1 tablet (10 mg total) by mouth 3 (three) times daily as needed for Muscle spasms.  Dispense: 45 tablet; Refill: 0    S/P cervical spinal fusion  **

## 2017-04-24 ENCOUNTER — OFFICE VISIT (OUTPATIENT)
Dept: URGENT CARE | Facility: CLINIC | Age: 47
End: 2017-04-24
Payer: COMMERCIAL

## 2017-04-24 ENCOUNTER — HOSPITAL ENCOUNTER (OUTPATIENT)
Dept: RADIOLOGY | Facility: HOSPITAL | Age: 47
Discharge: HOME | End: 2017-04-24
Attending: NURSE PRACTITIONER
Payer: COMMERCIAL

## 2017-04-24 VITALS
WEIGHT: 250 LBS | OXYGEN SATURATION: 97 % | HEIGHT: 74 IN | SYSTOLIC BLOOD PRESSURE: 130 MMHG | TEMPERATURE: 100 F | DIASTOLIC BLOOD PRESSURE: 82 MMHG | HEART RATE: 101 BPM | BODY MASS INDEX: 32.08 KG/M2

## 2017-04-24 DIAGNOSIS — S99.911A RIGHT ANKLE INJURY, INITIAL ENCOUNTER: ICD-10-CM

## 2017-04-24 DIAGNOSIS — S93.401A SPRAIN OF RIGHT ANKLE, UNSPECIFIED LIGAMENT, INITIAL ENCOUNTER: Primary | ICD-10-CM

## 2017-04-24 PROCEDURE — 73610 X-RAY EXAM OF ANKLE: CPT | Mod: TC,PO,RT

## 2017-04-24 PROCEDURE — 3079F DIAST BP 80-89 MM HG: CPT | Mod: S$GLB,,, | Performed by: NURSE PRACTITIONER

## 2017-04-24 PROCEDURE — 73610 X-RAY EXAM OF ANKLE: CPT | Mod: 26,RT,, | Performed by: RADIOLOGY

## 2017-04-24 PROCEDURE — 73630 X-RAY EXAM OF FOOT: CPT | Mod: TC,PO,RT

## 2017-04-24 PROCEDURE — 73630 X-RAY EXAM OF FOOT: CPT | Mod: 26,RT,, | Performed by: RADIOLOGY

## 2017-04-24 PROCEDURE — 99214 OFFICE O/P EST MOD 30 MIN: CPT | Mod: S$GLB,,, | Performed by: NURSE PRACTITIONER

## 2017-04-24 PROCEDURE — 1160F RVW MEDS BY RX/DR IN RCRD: CPT | Mod: S$GLB,,, | Performed by: NURSE PRACTITIONER

## 2017-04-24 PROCEDURE — 99999 PR PBB SHADOW E&M-EST. PATIENT-LVL IV: CPT | Mod: PBBFAC,,, | Performed by: NURSE PRACTITIONER

## 2017-04-24 PROCEDURE — 3075F SYST BP GE 130 - 139MM HG: CPT | Mod: S$GLB,,, | Performed by: NURSE PRACTITIONER

## 2017-04-24 RX ORDER — HYDROCODONE BITARTRATE AND ACETAMINOPHEN 5; 325 MG/1; MG/1
1 TABLET ORAL EVERY 6 HOURS PRN
Qty: 10 TABLET | Refills: 0 | Status: SHIPPED | OUTPATIENT
Start: 2017-04-24 | End: 2017-07-06 | Stop reason: ALTCHOICE

## 2017-04-24 NOTE — LETTER
April 24, 2017      [facility]  [address] [place] [address]  Phone: [phone]  Fax: [phone]       Patient: Manny Garner   YOB: 1970  Date of Visit: 04/24/2017    To Whom It May Concern:    Manny Noel was at [facility] on 04/24/2017. He may return to work/school on 04/26/2017 with no restrictions. If you have any questions or concerns, or if I can be of further assistance, please do not hesitate to contact me.    Sincerely,    Chana Delacruz LPN

## 2017-04-25 NOTE — PATIENT INSTRUCTIONS
Treating Ankle Sprains  Treatment will depend on how bad your sprain is. For a severe sprain, healing may take 3 months or more.  Right after your injury: Use R.I.C.E.  · Rest: At first, keep weight off the ankle as much as you can. You may be given crutches to help you walk without putting weight on the ankle.  · Ice: Put an ice pack on the ankle for 15 minutes. Remove the pack and wait at least 30 minutes. Repeat for up to 3 days. This helps reduce swelling.  · Compression: To reduce swelling and keep the joint stable, you may need to wrap the ankle with an elastic bandage. For more severe sprains, you may need an ankle brace or a cast.  · Elevation: To reduce swelling, keep your ankle raised above your heart when you sit or lie down.  Medicine  Your healthcare provider may suggest oral non-steroidal anti-inflammatory medicine (NSAIDs), such as ibuprofen. This relieves the pain and helps reduce any swelling. Be sure to take your medicine as directed.  Contrast baths  After 3 days, soak your ankle in warm water for 30 seconds, then in cool water for 30 seconds. Go back and forth for 5 minutes. Doing this every 2 hours will help keep the swelling down.  Exercises    After about 2 to 3 weeks, you may be given exercises to strengthen the ligaments and muscles in the ankle. Doing these exercises will help prevent another ankle sprain. Exercises may include standing on your toes and then on your heels and doing ankle curls.  Ankle curls  · Sit on the edge of a sturdy table or lie on your back.  · Pull your toes toward you. Then point them away from you. Repeat for 2 to 3 minutes.   Date Last Reviewed: 9/28/2015  © 0614-0993 [employer]. [address] [place] [address], [place] [address]. All rights reserved. This information is not intended as a substitute for professional medical care. Always follow your healthcare professional's instructions.

## 2017-04-25 NOTE — PROGRESS NOTES
"Subjective:       Patient ID: Manny Garner III is a 46 y.o. male.    Chief Complaint: Ankle Injury    HPI Comments: Patient was at [employer] this afternoon wearing a new pair of shoes than he is used to. He states that he stepped on a rock or piece of mulch which caused him to invert his right ankle. He is in a wheelchair today and is not able to bear weight. Patient took 2 aleve with no improvement.     Ankle Injury    The incident occurred 3 to 6 hours ago. Incident location: [employer]. The injury mechanism was a fall. The pain is present in the right ankle and right foot. The quality of the pain is described as aching. The pain is at a severity of 5/10. The pain is moderate. The pain has been constant since onset. Associated symptoms include an inability to bear weight and a loss of motion. Pertinent negatives include no loss of sensation, muscle weakness, numbness or tingling. He reports no foreign bodies present. The symptoms are aggravated by movement, palpation and weight bearing. He has tried NSAIDs for the symptoms. The treatment provided no relief.       /82 (BP Location: Left arm, Patient Position: Sitting, BP Method: Manual)  Pulse 101  Temp 99.6 °F (37.6 °C) (Tympanic)   Ht 6' 2" (1.88 m)  Wt 113.4 kg (250 lb)  SpO2 97%  BMI 32.1 kg/m2    Review of Systems   Constitutional: Positive for activity change. Negative for appetite change, chills, diaphoresis, fatigue, fever and unexpected weight change.   HENT: Negative.    Eyes: Negative.    Respiratory: Negative for apnea, chest tightness, shortness of breath and stridor.    Cardiovascular: Negative for chest pain, palpitations and leg swelling.   Gastrointestinal: Negative.    Endocrine: Negative.    Genitourinary: Negative.    Musculoskeletal: Positive for arthralgias, gait problem and joint swelling. Negative for back pain, myalgias, neck pain and neck stiffness.   Skin: Negative for color change, pallor, rash and wound. "   Allergic/Immunologic: Negative.    Neurological: Negative for dizziness, tingling, facial asymmetry, light-headedness, numbness and headaches.   Hematological: Negative for adenopathy.   Psychiatric/Behavioral: Negative for agitation and behavioral problems.       Objective:      Physical Exam   Constitutional: He is oriented to person, place, and time. He appears well-developed and well-nourished. He is cooperative. No distress.   HENT:   Head: Normocephalic and atraumatic.   Eyes: Conjunctivae are normal. Right eye exhibits no discharge. Left eye exhibits no discharge.   Pulmonary/Chest: Effort normal. No respiratory distress.   Abdominal: Soft. He exhibits no distension.   Musculoskeletal: He exhibits tenderness.        Right ankle: He exhibits decreased range of motion. He exhibits no swelling, no ecchymosis, no deformity, no laceration and normal pulse. Tenderness. Achilles tendon normal. Achilles tendon exhibits no pain and no defect.        Right foot: There is decreased range of motion, tenderness and bony tenderness. There is no swelling, normal capillary refill, no crepitus, no deformity and no laceration.        Feet:    ttp per drawing. There is no swelling, no bruising. Decreased ROM due to pain. nv intact, 2+ pedal pulses. Patient not able to bear weight.   Neurological: He is alert and oriented to person, place, and time.   Skin: Skin is warm and dry. No rash noted. He is not diaphoretic.   Psychiatric: He has a normal mood and affect. His behavior is normal. Judgment and thought content normal.   Nursing note and vitals reviewed.      Assessment:       1. Sprain of right ankle, unspecified ligament, initial encounter    2. Right ankle injury, initial encounter        Plan:       Manny was seen today for ankle injury.    Diagnoses and all orders for this visit:    Sprain of right ankle, unspecified ligament, initial encounter    Right ankle injury, initial encounter  -     X-Ray Foot Complete  Right; Future  -     X-Ray Ankle Complete Right; Future  -     hydrocodone-acetaminophen 5-325mg (NORCO) 5-325 mg per tablet; Take 1 tablet by mouth every 6 (six) hours as needed for Pain.    There is no evidence to suggest acute fracture or dislocation.  The ankle mortise is intact.  No obvious soft tissue swelling.  There is no evidence to suggest acute fracture or dislocation.  Small plantar and dorsal calcaneal enthesophytes are present.  Very minimal degenerative change present at the 1st MTP joint.  Xray readings were not available at the time of patient's visit due to the hours that he presented to clinic which was after radiology read hours. There was no obvious fx noted on xray on read by myself. Patient placed in boot and given crutches. He was instructed no weight bearing until x ray was read. Recommend rest, ice, elevation. I recommended nsaids for pain. Patient states that he took 2 aleve with no improvement and asked for something stronger. I offered ultram and he stated that that does not work and requested hydrocodone. norco prescribed 10 tablets, no refills. If pain continues, Patient will be referred to Orthopedics for further evaluation.     After x ray read Patient instructed that he may use boot and crutches for comfort but could also attempt to bear weight using boot as tolerated.

## 2017-07-03 ENCOUNTER — TELEPHONE (OUTPATIENT)
Dept: INTERNAL MEDICINE | Facility: CLINIC | Age: 47
End: 2017-07-03

## 2017-07-03 NOTE — TELEPHONE ENCOUNTER
----- Message from Tessa Lemon sent at 7/3/2017 11:15 AM CDT -----  Contact: pt  Pt wife ,,, calling because the pt is trying to get an eric for Thurs.... please call wife back at [phone]

## 2017-07-06 ENCOUNTER — OFFICE VISIT (OUTPATIENT)
Dept: INTERNAL MEDICINE | Facility: CLINIC | Age: 47
End: 2017-07-06
Payer: COMMERCIAL

## 2017-07-06 VITALS
WEIGHT: 260 LBS | HEART RATE: 88 BPM | BODY MASS INDEX: 34.46 KG/M2 | SYSTOLIC BLOOD PRESSURE: 162 MMHG | TEMPERATURE: 97 F | HEIGHT: 73 IN | DIASTOLIC BLOOD PRESSURE: 110 MMHG

## 2017-07-06 DIAGNOSIS — M54.2 CHRONIC NECK PAIN: ICD-10-CM

## 2017-07-06 DIAGNOSIS — I16.0 HYPERTENSIVE URGENCY: ICD-10-CM

## 2017-07-06 DIAGNOSIS — L30.9 ECZEMA, UNSPECIFIED TYPE: Primary | ICD-10-CM

## 2017-07-06 DIAGNOSIS — F10.10 ALCOHOL ABUSE: ICD-10-CM

## 2017-07-06 DIAGNOSIS — G89.29 CHRONIC NECK PAIN: ICD-10-CM

## 2017-07-06 PROCEDURE — 99214 OFFICE O/P EST MOD 30 MIN: CPT | Mod: S$GLB,,, | Performed by: PHYSICIAN ASSISTANT

## 2017-07-06 PROCEDURE — 99999 PR PBB SHADOW E&M-EST. PATIENT-LVL III: CPT | Mod: PBBFAC,,, | Performed by: PHYSICIAN ASSISTANT

## 2017-07-06 RX ORDER — GABAPENTIN 300 MG/1
CAPSULE ORAL
COMMUNITY
Start: 2017-07-03 | End: 2017-11-30 | Stop reason: SDUPTHER

## 2017-07-06 RX ORDER — NALTREXONE HYDROCHLORIDE 50 MG/1
TABLET, FILM COATED ORAL
COMMUNITY
Start: 2017-07-03 | End: 2017-11-30

## 2017-07-06 RX ORDER — DESONIDE 0.5 MG/G
CREAM TOPICAL 2 TIMES DAILY
Qty: 15 G | Refills: 0 | Status: SHIPPED | OUTPATIENT
Start: 2017-07-06 | End: 2018-05-18 | Stop reason: SDUPTHER

## 2017-07-06 RX ORDER — CLONIDINE HYDROCHLORIDE 0.1 MG/1
0.1 TABLET ORAL
Status: COMPLETED | OUTPATIENT
Start: 2017-07-06 | End: 2017-07-06

## 2017-07-06 RX ADMIN — CLONIDINE HYDROCHLORIDE 0.1 MG: 0.1 TABLET ORAL at 10:07

## 2017-07-06 NOTE — PROGRESS NOTES
Subjective:       Patient ID: Manny Garner III is a 47 y.o. male.    Chief Complaint: Neck Pain and Medication Problem    Patient comes in for rash on scalp. He has history of eczema    ortho referral for neck pain -  On/off chronic.   Also, his BP is very elevated today. He admits to starting intensive outpatient medical therapy for ETOH abuse and he has missed his BP medication over the last few weeks.  He feels fine, without headache, dizziness, cp.       Rash   This is a new problem. The problem is unchanged. The affected locations include the scalp. Pertinent negatives include no anorexia, congestion, cough, diarrhea, eye pain, facial edema, fatigue, fever, joint pain, nail changes, rhinorrhea, shortness of breath, sore throat or vomiting. Past treatments include nothing.       Past Medical History:   Diagnosis Date    Allergy     Anxiety     HTN (hypertension) 2011    Hyperlipidemia        Current Outpatient Prescriptions   Medication Sig Dispense Refill    amlodipine-valsartan (EXFORGE)  mg per tablet Take 1 tablet by mouth once daily. 90 tablet 3    BYSTOLIC 10 mg Tab TAKE 1 TABLET BY MOUTH ONCE DAILY 90 tablet 2    levocetirizine (XYZAL) 5 MG tablet Take 1 tablet (5 mg total) by mouth every evening. 30 tablet 0    paroxetine (PAXIL) 20 MG tablet Take 20 mg by mouth every morning.      desonide (DESOWEN) 0.05 % cream Apply topically 2 (two) times daily. 15 g 0    gabapentin (NEURONTIN) 300 MG capsule       naltrexone (DEPADE) 50 mg tablet       naproxen (NAPROSYN) 375 MG tablet Take 1 tablet (375 mg total) by mouth 2 (two) times daily with meals. 30 tablet 0     No current facility-administered medications for this visit.        Review of Systems   Constitutional: Negative for fatigue and fever.   HENT: Negative for congestion, rhinorrhea and sore throat.    Eyes: Negative for pain.   Respiratory: Negative for cough and shortness of breath.    Gastrointestinal: Negative for anorexia,  "diarrhea and vomiting.   Musculoskeletal: Negative for joint pain.   Skin: Positive for rash. Negative for nail changes.       Objective:   BP (!) 162/110   Pulse 88   Temp 97.3 °F (36.3 °C) (Tympanic)   Ht 6' 1" (1.854 m)   Wt 117.9 kg (260 lb)   BMI 34.30 kg/m²      Physical Exam      Lab Results   Component Value Date    WBC 9.25 11/21/2016    HGB 16.4 11/21/2016    HCT 48.6 11/21/2016     11/21/2016    CHOL 242 (H) 11/21/2016    TRIG 291 (H) 11/21/2016    HDL 53 11/21/2016    ALT 56 (H) 11/21/2016    AST 50 (H) 11/21/2016     11/21/2016    K 3.9 11/21/2016     11/21/2016    CREATININE 0.9 11/21/2016    BUN 11 11/21/2016    CO2 30 (H) 11/21/2016    HGBA1C 5.4 11/21/2016       Assessment:       1. Eczema, unspecified type    2. Hypertensive urgency    3. Alcohol abuse    4. Chronic neck pain        Plan:   Eczema, unspecified type  -     desonide (DESOWEN) 0.05 % cream; Apply topically 2 (two) times daily.  Dispense: 15 g; Refill: 0    Hypertensive urgency-     cloNIDine tablet 0.1 mg; Take 1 tablet (0.1 mg total) by mouth one time.  Patient restarted medication recently so would expect high #s.   Go home, rest and recheck BP   BP lowered to 170/100 prior to leaving clinic. Patient monitored for 30 mins.     Alcohol abuse- currently being MEDICALLY detoxed. This could be contributing to hypertension     Neck pain-chronic, referral to [facility]         "

## 2017-11-30 ENCOUNTER — OFFICE VISIT (OUTPATIENT)
Dept: INTERNAL MEDICINE | Facility: CLINIC | Age: 47
End: 2017-11-30
Payer: COMMERCIAL

## 2017-11-30 ENCOUNTER — LAB VISIT (OUTPATIENT)
Dept: LAB | Facility: HOSPITAL | Age: 47
End: 2017-11-30
Attending: FAMILY MEDICINE
Payer: COMMERCIAL

## 2017-11-30 VITALS
OXYGEN SATURATION: 98 % | HEART RATE: 87 BPM | DIASTOLIC BLOOD PRESSURE: 75 MMHG | SYSTOLIC BLOOD PRESSURE: 120 MMHG | HEIGHT: 73 IN | TEMPERATURE: 98 F | RESPIRATION RATE: 24 BRPM | BODY MASS INDEX: 35.12 KG/M2 | WEIGHT: 265 LBS

## 2017-11-30 DIAGNOSIS — I10 ESSENTIAL HYPERTENSION: ICD-10-CM

## 2017-11-30 DIAGNOSIS — M50.90 CERVICAL DISC DISEASE: ICD-10-CM

## 2017-11-30 LAB
ALBUMIN SERPL BCP-MCNC: 3.1 G/DL
ALP SERPL-CCNC: 50 U/L
ALT SERPL W/O P-5'-P-CCNC: 42 U/L
ANION GAP SERPL CALC-SCNC: 10 MMOL/L
AST SERPL-CCNC: 26 U/L
BILIRUB SERPL-MCNC: 0.3 MG/DL
BUN SERPL-MCNC: 14 MG/DL
CALCIUM SERPL-MCNC: 9.5 MG/DL
CHLORIDE SERPL-SCNC: 106 MMOL/L
CO2 SERPL-SCNC: 28 MMOL/L
CREAT SERPL-MCNC: 1 MG/DL
EST. GFR  (AFRICAN AMERICAN): >60 ML/MIN/1.73 M^2
EST. GFR  (NON AFRICAN AMERICAN): >60 ML/MIN/1.73 M^2
GLUCOSE SERPL-MCNC: 101 MG/DL
POTASSIUM SERPL-SCNC: 4 MMOL/L
PROT SERPL-MCNC: 7.1 G/DL
SODIUM SERPL-SCNC: 144 MMOL/L

## 2017-11-30 PROCEDURE — 99999 PR PBB SHADOW E&M-EST. PATIENT-LVL III: CPT | Mod: PBBFAC,,, | Performed by: FAMILY MEDICINE

## 2017-11-30 PROCEDURE — 36415 COLL VENOUS BLD VENIPUNCTURE: CPT | Mod: PO

## 2017-11-30 PROCEDURE — 80053 COMPREHEN METABOLIC PANEL: CPT | Mod: PO

## 2017-11-30 PROCEDURE — 99214 OFFICE O/P EST MOD 30 MIN: CPT | Mod: S$GLB,,, | Performed by: FAMILY MEDICINE

## 2017-11-30 PROCEDURE — 80061 LIPID PANEL: CPT

## 2017-11-30 RX ORDER — TRAMADOL HYDROCHLORIDE 50 MG/1
TABLET ORAL
COMMUNITY
Start: 2017-09-26 | End: 2017-12-15 | Stop reason: SDUPTHER

## 2017-11-30 RX ORDER — GABAPENTIN 600 MG/1
TABLET ORAL
COMMUNITY
Start: 2017-09-26 | End: 2018-05-17

## 2017-11-30 RX ORDER — NEBIVOLOL HYDROCHLORIDE 10 MG/1
10 TABLET ORAL DAILY
Qty: 90 TABLET | Refills: 3 | Status: SHIPPED | OUTPATIENT
Start: 2017-11-30 | End: 2018-05-17 | Stop reason: SDUPTHER

## 2017-11-30 RX ORDER — HYDROCODONE BITARTRATE AND ACETAMINOPHEN 5; 325 MG/1; MG/1
TABLET ORAL
COMMUNITY
Start: 2017-09-26 | End: 2017-12-15

## 2017-11-30 RX ORDER — PAROXETINE HYDROCHLORIDE HEMIHYDRATE 12.5 MG/1
TABLET, FILM COATED, EXTENDED RELEASE ORAL
COMMUNITY
Start: 2017-11-15 | End: 2017-11-30 | Stop reason: SDUPTHER

## 2017-11-30 RX ORDER — AMLODIPINE AND VALSARTAN 10; 320 MG/1; MG/1
1 TABLET ORAL DAILY
Qty: 90 TABLET | Refills: 3 | Status: SHIPPED | OUTPATIENT
Start: 2017-11-30 | End: 2018-05-17 | Stop reason: SDUPTHER

## 2017-11-30 RX ORDER — PAROXETINE HYDROCHLORIDE HEMIHYDRATE 12.5 MG/1
25 TABLET, FILM COATED, EXTENDED RELEASE ORAL DAILY
Qty: 90 TABLET | Refills: 3 | Status: SHIPPED | OUTPATIENT
Start: 2017-11-30 | End: 2018-05-17 | Stop reason: SDUPTHER

## 2017-11-30 NOTE — PROGRESS NOTES
Subjective:       Patient ID: Manny Garner III is a 47 y.o. male.    Chief Complaint: Establish Care    HPI  Here today to establish care.  He was previously seen by Dr. Lazo in [place] but has since moved to this area and he works in this area so it is easier for him to be seen here.  He is needing refills on his medication for hypertension and also requesting refill for pain management medication.  He does see Dr. Gerardo Veloz who does steroid injections once a month into his neck.  In 2012 he had a cervical discectomy with fusion but continues to have pain in relation to this.  Has been told that he would benefit from additional procedure but it would be a very complicated procedure and he has been recommended to wait to do this until he would develop symptoms of weakness or tingling down his arms.  No other active complaints at this time.  He is planning to join a gym    Patient Active Problem List   Diagnosis    HTN (hypertension)    Hyperlipidemia    Cervical disc disease       Family History   Problem Relation Age of Onset    Hypertension Mother     Diabetes Mother     Hypertension Father     Hypertension Maternal Grandmother     Hypertension Maternal Grandfather     Hypertension Paternal Grandmother     Hypertension Paternal Grandfather      Past Surgical History:   Procedure Laterality Date    CERVICAL DISC SURGERY  2012    fustion and corpectomy [id number]    VERTEBRAL CORPECTOMY  2012         Current Outpatient Prescriptions:     amlodipine-valsartan (EXFORGE)  mg per tablet, Take 1 tablet by mouth once daily., Disp: 90 tablet, Rfl: 3    BYSTOLIC 10 mg Tab, Take 1 tablet (10 mg total) by mouth once daily., Disp: 90 tablet, Rfl: 3    gabapentin (NEURONTIN) 600 MG tablet, , Disp: , Rfl:     hydrocodone-acetaminophen 5-325mg (NORCO) 5-325 mg per tablet, , Disp: , Rfl:     paroxetine (PAXIL-CR) 12.5 MG 24 hr tablet, Take 2 tablets (25 mg total) by mouth once daily., Disp: 90 tablet,  "Rfl: 3    traMADol (ULTRAM) 50 mg tablet, , Disp: , Rfl:     desonide (DESOWEN) 0.05 % cream, Apply topically 2 (two) times daily., Disp: 15 g, Rfl: 0    Review of Systems   Constitutional: Negative for chills and fever.   Eyes: Negative for visual disturbance.   Respiratory: Negative for cough and shortness of breath.    Cardiovascular: Negative for chest pain.   Gastrointestinal: Negative for abdominal pain.   Neurological: Negative for dizziness.       Objective:   /75 Comment: average readings at home  Pulse 87   Temp 97.6 °F (36.4 °C) (Tympanic)   Resp (!) 24   Ht 6' 0.5" (1.842 m)   Wt 120.2 kg (264 lb 15.9 oz)   SpO2 98%   BMI 35.45 kg/m²      Physical Exam   Constitutional: He is oriented to person, place, and time. He appears well-developed and well-nourished.   HENT:   Head: Normocephalic and atraumatic.   Eyes: Conjunctivae are normal.   Cardiovascular: Normal rate.    Pulmonary/Chest: Effort normal. No respiratory distress.   Musculoskeletal: He exhibits no edema.   Neurological: He is alert and oriented to person, place, and time. Coordination normal.   Skin: Skin is warm and dry. No rash noted.   Psychiatric: He has a normal mood and affect. His behavior is normal.   Vitals reviewed.      Assessment & Plan      1. Essential hypertension  Slightly elevated today but has been very well-controlled with his monitor at home.  Continue same medications and encouraged him to join a gym.  - amlodipine-valsartan (EXFORGE)  mg per tablet; Take 1 tablet by mouth once daily.  Dispense: 90 tablet; Refill: 3  - Comprehensive metabolic panel; Future  - Lipid panel; Future    2. Cervical disc disease  Recommended that he verify with his pain management physician that it would be okay for me to give refill of tramadol.  Would not have a problem doing this but if it continued to be a chronic recurrence would need to fill out a contract and get urine drug screening.      "

## 2017-12-01 ENCOUNTER — PATIENT MESSAGE (OUTPATIENT)
Dept: INTERNAL MEDICINE | Facility: CLINIC | Age: 47
End: 2017-12-01

## 2017-12-01 LAB
CHOLEST SERPL-MCNC: 277 MG/DL
CHOLEST/HDLC SERPL: 16.3 {RATIO}
HDLC SERPL-MCNC: 17 MG/DL
HDLC SERPL: 6.1 %
LDLC SERPL CALC-MCNC: 207.8 MG/DL
NONHDLC SERPL-MCNC: 260 MG/DL
TRIGL SERPL-MCNC: 261 MG/DL

## 2017-12-01 RX ORDER — ATORVASTATIN CALCIUM 20 MG/1
20 TABLET, FILM COATED ORAL DAILY
Qty: 90 TABLET | Refills: 3 | Status: SHIPPED | OUTPATIENT
Start: 2017-12-01 | End: 2018-05-17 | Stop reason: SDUPTHER

## 2017-12-01 NOTE — TELEPHONE ENCOUNTER
I see my Cholesterol results are high can you prescribe something for that and is there any thing I can take to help with weight loss.

## 2017-12-01 NOTE — TELEPHONE ENCOUNTER
I did send something for cholesterol. Focus should be on diet and exercise instead of relying on medication at this time.

## 2017-12-13 ENCOUNTER — PATIENT MESSAGE (OUTPATIENT)
Dept: INTERNAL MEDICINE | Facility: CLINIC | Age: 47
End: 2017-12-13

## 2017-12-15 ENCOUNTER — PATIENT MESSAGE (OUTPATIENT)
Dept: INTERNAL MEDICINE | Facility: CLINIC | Age: 47
End: 2017-12-15

## 2017-12-15 ENCOUNTER — TELEPHONE (OUTPATIENT)
Dept: INTERNAL MEDICINE | Facility: CLINIC | Age: 47
End: 2017-12-15

## 2017-12-15 DIAGNOSIS — M50.90 CERVICAL DISC DISEASE: Primary | ICD-10-CM

## 2017-12-15 RX ORDER — TRAMADOL HYDROCHLORIDE 50 MG/1
50 TABLET ORAL EVERY 8 HOURS PRN
Qty: 60 TABLET | Refills: 0 | Status: SHIPPED | OUTPATIENT
Start: 2017-12-15 | End: 2018-05-17 | Stop reason: SDUPTHER

## 2017-12-15 NOTE — TELEPHONE ENCOUNTER
I have read his email. I can refill medication for him but I recommend only tramadol or hydrocodone and not both of them. I would prefer to send tramadol for him to use for intermittent pain flare ups.   Referral made for chiropractor

## 2017-12-15 NOTE — TELEPHONE ENCOUNTER
I have decided to no longer use the pain management doctor. I don't like the requirements of having to constantly take medication and that he has prescribed me pain patches after I told him I didn't desire anything that strong. Is it possible for me to receive a referral for chiropractor. I also would like to have my tramadol and hydrocodone prescriptions fill via [facility] I prefer to only take the medications when I have pain flair ups and not everyday as prescribed by pain doctor. Also Please send all prescriptions to the [facility] in [place] oppose to [facility] in [place].     Thank you in advance   Manny Garner

## 2018-05-17 ENCOUNTER — OFFICE VISIT (OUTPATIENT)
Dept: INTERNAL MEDICINE | Facility: CLINIC | Age: 48
End: 2018-05-17
Payer: COMMERCIAL

## 2018-05-17 VITALS
SYSTOLIC BLOOD PRESSURE: 136 MMHG | BODY MASS INDEX: 29.62 KG/M2 | DIASTOLIC BLOOD PRESSURE: 82 MMHG | HEART RATE: 89 BPM | WEIGHT: 250.88 LBS | TEMPERATURE: 99 F | HEIGHT: 77 IN | OXYGEN SATURATION: 97 % | RESPIRATION RATE: 18 BRPM

## 2018-05-17 DIAGNOSIS — F41.9 ANXIETY AND DEPRESSION: ICD-10-CM

## 2018-05-17 DIAGNOSIS — F32.A ANXIETY AND DEPRESSION: ICD-10-CM

## 2018-05-17 DIAGNOSIS — I10 ESSENTIAL HYPERTENSION: Primary | ICD-10-CM

## 2018-05-17 DIAGNOSIS — M25.572 CHRONIC PAIN OF LEFT ANKLE: ICD-10-CM

## 2018-05-17 DIAGNOSIS — G89.29 CHRONIC PAIN OF LEFT ANKLE: ICD-10-CM

## 2018-05-17 DIAGNOSIS — E78.2 MIXED HYPERLIPIDEMIA: ICD-10-CM

## 2018-05-17 DIAGNOSIS — M50.90 CERVICAL DISC DISEASE: ICD-10-CM

## 2018-05-17 PROCEDURE — 99214 OFFICE O/P EST MOD 30 MIN: CPT | Mod: S$GLB,,, | Performed by: NURSE PRACTITIONER

## 2018-05-17 PROCEDURE — 3075F SYST BP GE 130 - 139MM HG: CPT | Mod: CPTII,S$GLB,, | Performed by: NURSE PRACTITIONER

## 2018-05-17 PROCEDURE — 99999 PR PBB SHADOW E&M-EST. PATIENT-LVL III: CPT | Mod: PBBFAC,,, | Performed by: NURSE PRACTITIONER

## 2018-05-17 PROCEDURE — 3079F DIAST BP 80-89 MM HG: CPT | Mod: CPTII,S$GLB,, | Performed by: NURSE PRACTITIONER

## 2018-05-17 PROCEDURE — 3008F BODY MASS INDEX DOCD: CPT | Mod: CPTII,S$GLB,, | Performed by: NURSE PRACTITIONER

## 2018-05-17 RX ORDER — NEBIVOLOL 10 MG/1
10 TABLET ORAL DAILY
Qty: 90 TABLET | Refills: 1 | Status: SHIPPED | OUTPATIENT
Start: 2018-05-17

## 2018-05-17 RX ORDER — PAROXETINE HYDROCHLORIDE HEMIHYDRATE 12.5 MG/1
25 TABLET, FILM COATED, EXTENDED RELEASE ORAL DAILY
Qty: 90 TABLET | Refills: 1 | Status: SHIPPED | OUTPATIENT
Start: 2018-05-17

## 2018-05-17 RX ORDER — AMLODIPINE AND VALSARTAN 10; 320 MG/1; MG/1
1 TABLET ORAL DAILY
Qty: 90 TABLET | Refills: 1 | Status: SHIPPED | OUTPATIENT
Start: 2018-05-17 | End: 2019-05-17

## 2018-05-17 RX ORDER — GABAPENTIN 600 MG/1
600 TABLET ORAL 3 TIMES DAILY
Qty: 90 TABLET | Refills: 1 | Status: SHIPPED | OUTPATIENT
Start: 2018-05-17 | End: 2018-11-01 | Stop reason: SDUPTHER

## 2018-05-17 RX ORDER — ATORVASTATIN CALCIUM 20 MG/1
20 TABLET, FILM COATED ORAL DAILY
Qty: 90 TABLET | Refills: 1 | Status: SHIPPED | OUTPATIENT
Start: 2018-05-17 | End: 2019-05-17

## 2018-05-18 DIAGNOSIS — L30.9 ECZEMA, UNSPECIFIED TYPE: ICD-10-CM

## 2018-05-18 RX ORDER — TRAMADOL HYDROCHLORIDE 50 MG/1
50 TABLET ORAL EVERY 8 HOURS PRN
Qty: 60 TABLET | Refills: 0 | Status: CANCELLED | OUTPATIENT
Start: 2018-05-18

## 2018-05-18 RX ORDER — TRAMADOL HYDROCHLORIDE 50 MG/1
50 TABLET ORAL EVERY 8 HOURS PRN
Qty: 60 TABLET | Refills: 0 | Status: SHIPPED | OUTPATIENT
Start: 2018-05-18

## 2018-05-18 RX ORDER — DESONIDE 0.5 MG/G
CREAM TOPICAL 2 TIMES DAILY
Qty: 15 G | Refills: 0 | Status: SHIPPED | OUTPATIENT
Start: 2018-05-18 | End: 2018-05-28

## 2018-05-18 NOTE — PROGRESS NOTES
Subjective:       Patient ID: Manny Garner III is a 47 y.o. male.    Chief Complaint: Neck Pain and Ankle Pain    Had a bad sprain few years ago that required non weight bearing few weeks then boot. Pain resolved but it returns occasionally. Has been hurting to 2 weeks now.      Hypertension   This is a chronic problem. The current episode started more than 1 year ago. The problem is controlled. Associated symptoms include neck pain (chronic). Pertinent negatives include no anxiety, blurred vision, chest pain, headaches, malaise/fatigue, orthopnea, palpitations, peripheral edema, PND, shortness of breath or sweats. Agents associated with hypertension include NSAIDs. Risk factors for coronary artery disease include dyslipidemia, sedentary lifestyle and male gender. Past treatments include angiotensin blockers, calcium channel blockers and beta blockers. Compliance problems include diet and exercise.      Review of Systems   Constitutional: Negative.  Negative for malaise/fatigue.   HENT: Negative.    Eyes: Negative for blurred vision.   Respiratory: Negative for chest tightness, shortness of breath and wheezing.    Cardiovascular: Negative for chest pain, palpitations, orthopnea, leg swelling and PND.   Gastrointestinal: Negative for abdominal pain, constipation, diarrhea, nausea and vomiting.   Endocrine: Negative.    Genitourinary: Negative.    Musculoskeletal: Positive for neck pain (chronic). Negative for back pain, gait problem, joint swelling and myalgias.   Skin: Negative for color change, rash and wound.   Allergic/Immunologic: Negative.    Neurological: Negative for dizziness, weakness, light-headedness and headaches.   Psychiatric/Behavioral: Negative.        Objective:      Physical Exam   Constitutional: He is oriented to person, place, and time. Vital signs are normal. He appears well-developed and well-nourished. No distress.   Neck: No JVD present.   Cardiovascular: Normal rate, regular rhythm and  normal heart sounds.  Exam reveals no gallop and no friction rub.    No murmur heard.  Pulmonary/Chest: Effort normal and breath sounds normal. No respiratory distress. He has no wheezes. He has no rhonchi. He has no rales.   Abdominal: Soft. He exhibits no distension. There is no tenderness.   Musculoskeletal:        Right ankle: He exhibits normal range of motion, no swelling and no deformity. Tenderness. AITFL (mild) tenderness found.        Cervical back: He exhibits tenderness and pain. He exhibits normal range of motion, no bony tenderness, no swelling and no spasm.   Neurological: He is alert and oriented to person, place, and time. He displays a negative Romberg sign. Coordination and gait normal.   Skin: Skin is warm and dry. No rash noted. He is not diaphoretic.   Psychiatric: He has a normal mood and affect. His behavior is normal. Thought content normal. His mood appears not anxious. He does not exhibit a depressed mood.   Nursing note and vitals reviewed.      Assessment:       1. Essential hypertension    2. Cervical disc disease    3. Mixed hyperlipidemia    4. Anxiety and depression    5. Chronic pain of left ankle        Plan:     Problem List Items Addressed This Visit        Neuro    Cervical disc disease    Current Assessment & Plan     S/p fusion. Uses tramadol occasionally for pain from Dr Cárdenas. Will request a refill for him         Relevant Medications    gabapentin (NEURONTIN) 600 MG tablet       Psychiatric    Anxiety and depression    Current Assessment & Plan     Doing well on paxil         Relevant Medications    paroxetine (PAXIL-CR) 12.5 MG 24 hr tablet       Cardiac/Vascular    HTN (hypertension) - Primary    Current Assessment & Plan     Controlled, continue current meds         Relevant Medications    amlodipine-valsartan (EXFORGE)  mg per tablet    Hyperlipidemia    Current Assessment & Plan     Controlled on statin         Relevant Medications    nebivolol (BYSTOLIC) 10 MG  Tab    atorvastatin (LIPITOR) 20 MG tablet       Orthopedic    Chronic pain of left ankle    Current Assessment & Plan      Recommend Rest, Ice, Compression. If not improving follow with ortho             Follow-up if symptoms worsen or fail to improve.

## 2018-05-22 RX ORDER — NEBIVOLOL HYDROCHLORIDE 10 MG/1
TABLET ORAL
Qty: 90 TABLET | Refills: 0 | Status: SHIPPED | OUTPATIENT
Start: 2018-05-22

## 2018-10-23 RX ORDER — TRAMADOL HYDROCHLORIDE 50 MG/1
50 TABLET ORAL EVERY 8 HOURS PRN
Qty: 60 TABLET | Refills: 0 | OUTPATIENT
Start: 2018-10-23

## 2018-11-01 ENCOUNTER — PATIENT MESSAGE (OUTPATIENT)
Dept: INTERNAL MEDICINE | Facility: CLINIC | Age: 48
End: 2018-11-01

## 2018-11-01 DIAGNOSIS — M50.90 CERVICAL DISC DISEASE: ICD-10-CM

## 2018-11-01 RX ORDER — GABAPENTIN 600 MG/1
600 TABLET ORAL 3 TIMES DAILY
Qty: 90 TABLET | Refills: 0 | Status: SHIPPED | OUTPATIENT
Start: 2018-11-01

## 2018-12-04 ENCOUNTER — PATIENT OUTREACH (OUTPATIENT)
Dept: ADMINISTRATIVE | Facility: HOSPITAL | Age: 48
End: 2018-12-04

## 2019-04-05 DIAGNOSIS — Z12.11 COLON CANCER SCREENING: ICD-10-CM

## 2019-05-14 DIAGNOSIS — Z12.11 COLON CANCER SCREENING: ICD-10-CM

## 2019-10-25 ENCOUNTER — PATIENT OUTREACH (OUTPATIENT)
Dept: ADMINISTRATIVE | Facility: HOSPITAL | Age: 49
End: 2019-10-25

## 2020-07-30 PROBLEM — E78.49 OTHER HYPERLIPIDEMIA: Status: ACTIVE | Noted: 2020-07-30

## 2020-07-30 PROBLEM — F10.10 ALCOHOL ABUSE: Status: ACTIVE | Noted: 2020-07-30

## 2020-07-30 PROBLEM — F41.9 ANXIETY: Status: ACTIVE | Noted: 2020-07-30

## 2020-07-30 PROBLEM — I10 ESSENTIAL HYPERTENSION: Status: ACTIVE | Noted: 2020-07-30

## 2020-10-06 ENCOUNTER — PATIENT MESSAGE (OUTPATIENT)
Dept: ADMINISTRATIVE | Facility: HOSPITAL | Age: 50
End: 2020-10-06

## 2021-02-06 PROBLEM — F41.1 GENERALIZED ANXIETY DISORDER: Status: ACTIVE | Noted: 2021-02-06

## 2021-02-06 PROBLEM — F33.1 MODERATE EPISODE OF RECURRENT MAJOR DEPRESSIVE DISORDER (HCC): Status: ACTIVE | Noted: 2021-02-06

## 2021-03-07 PROBLEM — F10.20 UNCOMPLICATED ALCOHOL DEPENDENCE (HCC): Status: ACTIVE | Noted: 2021-03-07

## 2021-05-05 PROBLEM — F33.2 SEVERE EPISODE OF RECURRENT MAJOR DEPRESSIVE DISORDER, WITHOUT PSYCHOTIC FEATURES (HCC): Status: ACTIVE | Noted: 2021-02-06

## 2022-02-07 PROBLEM — E29.1 PRIMARY HYPOGONADISM IN MALE: Status: ACTIVE | Noted: 2022-02-07

## 2022-05-10 ENCOUNTER — HOSPITAL ENCOUNTER (EMERGENCY)
Facility: HOSPITAL | Age: 52
Discharge: LEFT BEFORE DISCHARGE | End: 2022-05-10
Payer: COMMERCIAL

## 2022-05-10 VITALS
WEIGHT: 250 LBS | SYSTOLIC BLOOD PRESSURE: 107 MMHG | HEIGHT: 73 IN | RESPIRATION RATE: 18 BRPM | DIASTOLIC BLOOD PRESSURE: 69 MMHG | TEMPERATURE: 97 F | HEART RATE: 77 BPM | OXYGEN SATURATION: 95 % | BODY MASS INDEX: 33.13 KG/M2

## 2022-05-10 PROCEDURE — 93005 ELECTROCARDIOGRAM TRACING: CPT

## 2022-05-11 LAB
ATRIAL RATE: 74 BPM
P AXIS: 47 DEGREES
P-R INTERVAL: 170 MS
Q-T INTERVAL: 396 MS
QRS DURATION: 92 MS
QTC CALCULATION (BEZET): 439 MS
R AXIS: 65 DEGREES
T AXIS: 69 DEGREES
VENTRICULAR RATE: 74 BPM

## 2022-05-11 NOTE — ED INITIAL ASSESSMENT (HPI)
Pt to ED for c/o non-radiating left-sided chest pain onset 1 hr PTA. Pt also had a witnessed 1-min syncopal episode after feeling light-headed all day today. Pt had a similar episode 3 days ago. Pt admit to drinks 4 shots of whiskey 2 hrs PTA.

## 2023-08-04 ENCOUNTER — APPOINTMENT (OUTPATIENT)
Dept: CT IMAGING | Facility: HOSPITAL | Age: 53
End: 2023-08-04
Attending: STUDENT IN AN ORGANIZED HEALTH CARE EDUCATION/TRAINING PROGRAM
Payer: COMMERCIAL

## 2023-08-04 ENCOUNTER — APPOINTMENT (OUTPATIENT)
Dept: GENERAL RADIOLOGY | Facility: HOSPITAL | Age: 53
End: 2023-08-04
Attending: STUDENT IN AN ORGANIZED HEALTH CARE EDUCATION/TRAINING PROGRAM
Payer: COMMERCIAL

## 2023-08-04 ENCOUNTER — HOSPITAL ENCOUNTER (EMERGENCY)
Facility: HOSPITAL | Age: 53
Discharge: HOME | End: 2023-08-04
Attending: STUDENT IN AN ORGANIZED HEALTH CARE EDUCATION/TRAINING PROGRAM
Payer: COMMERCIAL

## 2023-08-04 VITALS
SYSTOLIC BLOOD PRESSURE: 178 MMHG | OXYGEN SATURATION: 96 % | DIASTOLIC BLOOD PRESSURE: 103 MMHG | HEART RATE: 88 BPM | TEMPERATURE: 98 F | RESPIRATION RATE: 18 BRPM

## 2023-08-04 DIAGNOSIS — R41.0 DISORIENTATION: Primary | ICD-10-CM

## 2023-08-04 DIAGNOSIS — F10.90 ALCOHOL USE DISORDER: ICD-10-CM

## 2023-08-04 LAB
ANION GAP SERPL CALC-SCNC: 3 MMOL/L (ref 0–18)
ATRIAL RATE: 94 BPM
BASOPHILS # BLD AUTO: 0.06 X10(3) UL (ref 0–0.2)
BASOPHILS NFR BLD AUTO: 0.9 %
BILIRUB UR QL STRIP.AUTO: NEGATIVE
BUN BLD-MCNC: 13 MG/DL (ref 7–18)
CALCIUM BLD-MCNC: 9 MG/DL (ref 8.5–10.1)
CHLORIDE SERPL-SCNC: 108 MMOL/L (ref 98–112)
CLARITY UR REFRACT.AUTO: CLEAR
CO2 SERPL-SCNC: 29 MMOL/L (ref 21–32)
COLOR UR AUTO: YELLOW
CREAT BLD-MCNC: 0.8 MG/DL
EGFRCR SERPLBLD CKD-EPI 2021: 105 ML/MIN/1.73M2 (ref 60–?)
EOSINOPHIL # BLD AUTO: 0.14 X10(3) UL (ref 0–0.7)
EOSINOPHIL NFR BLD AUTO: 2.1 %
ERYTHROCYTE [DISTWIDTH] IN BLOOD BY AUTOMATED COUNT: 13.4 %
ETHANOL SERPL-MCNC: <3 MG/DL (ref ?–3)
GLUCOSE BLD-MCNC: 112 MG/DL (ref 70–99)
GLUCOSE BLD-MCNC: 119 MG/DL (ref 70–99)
GLUCOSE BLD-MCNC: 121 MG/DL (ref 70–99)
GLUCOSE UR STRIP.AUTO-MCNC: NEGATIVE MG/DL
HCT VFR BLD AUTO: 43.2 %
HGB BLD-MCNC: 15.7 G/DL
IMM GRANULOCYTES # BLD AUTO: 0.08 X10(3) UL (ref 0–1)
IMM GRANULOCYTES NFR BLD: 1.2 %
KETONES UR STRIP.AUTO-MCNC: NEGATIVE MG/DL
LEUKOCYTE ESTERASE UR QL STRIP.AUTO: NEGATIVE
LYMPHOCYTES # BLD AUTO: 1.8 X10(3) UL (ref 1–4)
LYMPHOCYTES NFR BLD AUTO: 27.1 %
MCH RBC QN AUTO: 31.7 PG (ref 26–34)
MCHC RBC AUTO-ENTMCNC: 36.3 G/DL (ref 31–37)
MCV RBC AUTO: 87.3 FL
MONOCYTES # BLD AUTO: 0.48 X10(3) UL (ref 0.1–1)
MONOCYTES NFR BLD AUTO: 7.2 %
NEUTROPHILS # BLD AUTO: 4.08 X10 (3) UL (ref 1.5–7.7)
NEUTROPHILS # BLD AUTO: 4.08 X10(3) UL (ref 1.5–7.7)
NEUTROPHILS NFR BLD AUTO: 61.5 %
NITRITE UR QL STRIP.AUTO: NEGATIVE
OSMOLALITY SERPL CALC.SUM OF ELEC: 291 MOSM/KG (ref 275–295)
P AXIS: 29 DEGREES
P-R INTERVAL: 154 MS
PH UR STRIP.AUTO: 7 [PH] (ref 5–8)
PLATELET # BLD AUTO: 232 10(3)UL (ref 150–450)
POTASSIUM SERPL-SCNC: 3.3 MMOL/L (ref 3.5–5.1)
PROT UR STRIP.AUTO-MCNC: 30 MG/DL
Q-T INTERVAL: 378 MS
QRS DURATION: 92 MS
QTC CALCULATION (BEZET): 472 MS
R AXIS: -10 DEGREES
RBC # BLD AUTO: 4.95 X10(6)UL
RBC UR QL AUTO: NEGATIVE
SODIUM SERPL-SCNC: 140 MMOL/L (ref 136–145)
SP GR UR STRIP.AUTO: 1.01 (ref 1–1.03)
T AXIS: 2 DEGREES
TROPONIN I HIGH SENSITIVITY: 22 NG/L
UROBILINOGEN UR STRIP.AUTO-MCNC: <2 MG/DL
VENTRICULAR RATE: 94 BPM
WBC # BLD AUTO: 6.6 X10(3) UL (ref 4–11)

## 2023-08-04 PROCEDURE — 82962 GLUCOSE BLOOD TEST: CPT

## 2023-08-04 PROCEDURE — 93005 ELECTROCARDIOGRAM TRACING: CPT

## 2023-08-04 PROCEDURE — 80048 BASIC METABOLIC PNL TOTAL CA: CPT | Performed by: STUDENT IN AN ORGANIZED HEALTH CARE EDUCATION/TRAINING PROGRAM

## 2023-08-04 PROCEDURE — 84484 ASSAY OF TROPONIN QUANT: CPT | Performed by: STUDENT IN AN ORGANIZED HEALTH CARE EDUCATION/TRAINING PROGRAM

## 2023-08-04 PROCEDURE — 99285 EMERGENCY DEPT VISIT HI MDM: CPT

## 2023-08-04 PROCEDURE — 82077 ASSAY SPEC XCP UR&BREATH IA: CPT | Performed by: STUDENT IN AN ORGANIZED HEALTH CARE EDUCATION/TRAINING PROGRAM

## 2023-08-04 PROCEDURE — 71045 X-RAY EXAM CHEST 1 VIEW: CPT | Performed by: STUDENT IN AN ORGANIZED HEALTH CARE EDUCATION/TRAINING PROGRAM

## 2023-08-04 PROCEDURE — 70450 CT HEAD/BRAIN W/O DYE: CPT | Performed by: STUDENT IN AN ORGANIZED HEALTH CARE EDUCATION/TRAINING PROGRAM

## 2023-08-04 PROCEDURE — 85025 COMPLETE CBC W/AUTO DIFF WBC: CPT | Performed by: STUDENT IN AN ORGANIZED HEALTH CARE EDUCATION/TRAINING PROGRAM

## 2023-08-04 PROCEDURE — 81001 URINALYSIS AUTO W/SCOPE: CPT | Performed by: STUDENT IN AN ORGANIZED HEALTH CARE EDUCATION/TRAINING PROGRAM

## 2023-08-04 PROCEDURE — 36415 COLL VENOUS BLD VENIPUNCTURE: CPT

## 2023-08-04 PROCEDURE — 93010 ELECTROCARDIOGRAM REPORT: CPT

## 2023-08-04 RX ORDER — POTASSIUM CHLORIDE 20 MEQ/1
40 TABLET, EXTENDED RELEASE ORAL ONCE
Status: COMPLETED | OUTPATIENT
Start: 2023-08-04 | End: 2023-08-04

## 2023-08-04 NOTE — ED QUICK NOTES
Bedside report received from  [facility]. Patient is awaiting DC per MD at this time. Plan of care reviewed with patient.

## 2023-08-04 NOTE — ED INITIAL ASSESSMENT (HPI)
Patient arrives to ED after waking up at 530am this morning and couldn't remember where he worked and felt disoriented. Pt did know where he was when he woke up. Pt currently aware of where he is and what is going on but still feels mildly confused.